# Patient Record
(demographics unavailable — no encounter records)

---

## 2025-04-28 NOTE — PHYSICAL EXAM
[Normal Rate and Rhythm] : normal rate and rhythm [2+] : left 2+ [Ankle Swelling (On Exam)] : present [Ankle Swelling Bilaterally] : bilaterally  [Ankle Swelling On The Right] : mild [Varicose Veins Of Lower Extremities] : not present [] : not present [Abdomen Tenderness] : ~T ~M No abdominal tenderness [Alert] : alert [Oriented to Person] : oriented to person [Oriented to Place] : oriented to place [Oriented to Time] : oriented to time [Calm] : calm [de-identified] : NAD [de-identified] : supple, no masses [de-identified] : unlabored breathing [de-identified] : FROM of all 4 extremities

## 2025-04-28 NOTE — PHYSICAL EXAM
[Normal Conjunctiva] : normal conjunctiva [Normal Venous Pressure] : normal venous pressure [No Carotid Bruit] : no carotid bruit [Clear Lung Fields] : clear lung fields [Good Air Entry] : good air entry [No Respiratory Distress] : no respiratory distress  [Non Tender] : non-tender [Normal Radial B/L] : normal radial B/L [Normal] : no rash, no skin lesions [Well Developed] : well developed [Well Nourished] : well nourished [Elevated JVD ____cm] : elevated JVD ~Vcm [Normal S1, S2] : normal S1, S2 [S4] : S4 [Murmur] : murmur [Mc ___] : mc CarterV [Soft] : abdomen soft [Hepatomegaly] : hepatomegaly [Abnormal Gait] : abnormal gait [Edema ___] : edema [unfilled] [No Rash] : no rash [Memory Deficit] : memory deficit [Cognitive Impairment] : cognitive impairment [Appears Anxious] : appears anxious [de-identified] : ESM at B

## 2025-04-28 NOTE — REASON FOR VISIT
[Symptom and Test Evaluation] : symptom and test evaluation [Hyperlipidemia] : hyperlipidemia [Hypertension] : hypertension [Other: ____] : [unfilled] [Family Member] : family member

## 2025-04-28 NOTE — ASSESSMENT
[FreeTextEntry1] : Chest pain atypical will get echocardiogram and pharmacologic nuclear stress to rule out ischemia.  Hypertension controlled multiple drugs.Continue  Hyperlipidemia will continue current medications get lipid levels.  Diabetes mellitus on multiple medications including insulin we will monitor.  CKD disease and pending hemodialysis with blood levels including CBC to check for degree of anemia.

## 2025-04-28 NOTE — CARDIOLOGY SUMMARY
[de-identified] : Electrocardiogram done today 4/28/2020 is reviewed by me revealing normal sinus rhythm at 66 bpm. P wave   Abnormality normal R progression.

## 2025-04-28 NOTE — HISTORY OF PRESENT ILLNESS
[FreeTextEntry1] : 76-year-old male with a history of longstanding diabetes hypertension hyperlipidemia who in some planes doing great check to half years ago and since then he has been gradually progressively declining to the point that about 4 months ago was told that he had recent end-stage renal disease and required with possible dialysis.  Patient elected to go to [facility] for 3 months and now returns for.  He has been referred to me for cardiovascular evaluation prior to planned AV fistula insertion  for hemodialysis.  Patient claims he has poor appetite and has lost weight is mentally not as alert as he used to be before there is no syncope he does have occasional palpitations he has shortness of breath on exertion walking about a block also which he claims is better since he has been taking some of the inhalers he does have some swelling of the feet which is noticed recently.

## 2025-04-28 NOTE — HISTORY OF PRESENT ILLNESS
[FreeTextEntry1] : 76-year-old former smoking male with past medical history of hypertension, hyperlipidemia, diabetes mellitus type 2, asthma, CKD stage IV presenting for evaluation for creation of HD access. Pt is right hand dominant. Denies complaints of chest pain, SOB, MCCARTHY, claudication, leg wounds or leg edema at this time. Denies history of central lines, prior catheters, or pacemaker. Patient will require initiation of HD in the next few months, per nephrologist, Dr. Lima.

## 2025-04-28 NOTE — ASSESSMENT
[FreeTextEntry1] : 76-year-old former smoking male with past medical history of hypertension, hyperlipidemia, diabetes mellitus type 2, asthma, CKD stage IV presenting for evaluation for creation of HD access.  Vein mapping of left arm performed in the office today demonstrates suitable cephalic and basilic veins which are patent and at least 2mm in diameter. No DVT or SVT.  Plan Patient will be scheduled for creation of LUE AVF, possible graft Medicine and cardiology clearance will be obtained preoperatively He will have to hold Januvia prior to undergoing the procedure Patient reminded to restrict IVs, blood draws and BP measurements from the arm Risks and complications of the procedure explained in office today, including bleeding, infection, thrombosis, hand/ digit ischemia or neuropathy requiring immediate ligation of access and she/he is agreeable to proceed

## 2025-04-28 NOTE — REVIEW OF SYSTEMS
[Weight Loss (___ Lbs)] : [unfilled] ~Ulb weight loss [Sore Throat] : sore throat [Sinus Pressure] : sinus pressure [SOB] : shortness of breath [Dyspnea on exertion] : dyspnea during exertion [Chest Discomfort] : chest discomfort [Joint Pain] : joint pain [Confusion] : confusion was observed [Anxiety] : anxiety [Negative] : Heme/Lymph [Lower Ext Edema] : no extremity edema [Leg Claudication] : no intermittent leg claudication [Palpitations] : no palpitations [Orthopnea] : no orthopnea [PND] : no PND [Syncope] : no syncope [Cough] : no cough [Wheezing] : no wheezing [Blood in stool] : no blood in stoo [Urinary Frequency] : no change in urinary frequency [Dizziness] : no dizziness [FreeTextEntry8] : H/O Ca Prostate

## 2025-07-07 NOTE — PROCEDURE
[FreeTextEntry1] : PET/CT 5/7/2024: No evidence of malignant nodules in the lungs.  Small areas of subsegmental atelectasis in both lungs.  No evident pleural effusion.  No evident adenopathy.  See report.  CT chest 5/21/2025: Mild focal bronchiectasis and bronchial wall thickening.  Mild emphysematous changes.  Multiple nodules measuring up to 4 mm in size.  No lymphadenopathy.  PFT 7/7/2025: Moderate obstruction.  Mild restriction.  Moderate reduction in diffusion.  No significant change after bronchodilator.  The findings are consistent with emphysema.

## 2025-07-07 NOTE — DISCUSSION/SUMMARY
[FreeTextEntry1] : Impression Moderate COPD Pulmonary nodules Former smoker - Stopped in the remote past CKD  Recommend Advised to use Breztri 2 puffs twice daily as indicated Continue with albuterol as needed Follow-up CT of the chest - At 3-month interval as per radiology I will see him again in 2 months or sooner if needed   Time spent preparing for the visit, interviewing and examining the patient, reviewing pertinent and data imaging, discussing the issues and recommendations was 45 minutes excluding other billable services.   PFT performed to assist in clinical decision making.

## 2025-07-07 NOTE — HISTORY OF PRESENT ILLNESS
[Former] : former [Never] : never [Difficulty Maintaining Sleep] : difficulty maintaining sleep [TextBox_4] : He is a 76-year-old man with a history of hypertension, hyperlipidemia, diabetes, chronic kidney disease, anemia and prostate cancer.  He presented with a chronic cough.  It is productive of times, mostly producing yellow phlegm.  No hemoptysis.  He has been told that he may have COPD and was placed on albuterol which he uses as needed.  He was also given Breztri but does not use it regularly.  He has a history of ESRD and recently had a fistula placed in his left arm.  Hemodialysis has not been started but it is anticipated that it would be needed in the near future. [YearQuit] : 1995

## 2025-07-07 NOTE — REVIEW OF SYSTEMS
[Nasal Congestion] : nasal congestion [Cough] : cough [Sputum] : sputum [Diabetes] : diabetes [Fever] : no fever [Fatigue] : no fatigue [Hemoptysis] : no hemoptysis [Chest Tightness] : no chest tightness [Wheezing] : no wheezing [Chest Discomfort] : no chest discomfort [Hay Fever] : no hay fever [GERD] : no gerd [Back Pain] : no back pain [Rash] : no rash [Anxiety] : no anxiety [Obesity] : no obesity

## 2025-07-28 NOTE — REVIEW OF SYSTEMS
[Feeling Fatigued] : feeling fatigued [Blurry Vision] : blurred vision [Dyspnea on exertion] : dyspnea during exertion [Chest Discomfort] : chest discomfort [Lower Ext Edema] : lower extremity edema [Joint Pain] : joint pain [Negative] : Heme/Lymph [SOB] : no shortness of breath [Leg Claudication] : no intermittent leg claudication [Palpitations] : no palpitations [Orthopnea] : no orthopnea [PND] : no PND [Syncope] : no syncope [Urinary Frequency] : no change in urinary frequency

## 2025-07-28 NOTE — REASON FOR VISIT
[Structural Heart and Valve Disease] : structural heart and valve disease [Hyperlipidemia] : hyperlipidemia [Hypertension] : hypertension [Coronary Artery Disease] : coronary artery disease [Carotid, Aortic and Peripheral Vascular Disease] : carotid, aortic and peripheral vascular disease [Other: ____] : [unfilled] [Family Member] : family member

## 2025-07-28 NOTE — CARDIOLOGY SUMMARY
[de-identified] : EKG done today on 7/28/2025 reviewed by me reveals sinus bradycardia 54 bpm otherwise unremarkable. [de-identified] : 5/6/2025 Conclusions: 1. Normal myocardial perfusion scan, with no evidence of infarction or inducible ischemia. 2. Normal left ventricular regional wall motion. 3. Normal left ventricular diastolic function. The post stress left ventricular EF is 69 %. The stress end  diastolic volume is 89 ml and systolic volume is 27 ml. 4. Normal right ventricular function. There is no right ventricular dilation [de-identified] : 5/5/2025 CONCLUSIONS: 1. Left ventricular systolic function is normal with an ejection fraction of 60 % by Vidal's method of  disks. 2. Left ventricular global longitudinal strain is -19.2 % which is normal (< -18%). Images were acquired on  a [employer] ultrasound system and processed on the ultrasound machine with a heart rate of 88 bpm and a  blood pressure of 142/63 mmHg. 3. Normal right ventricular cavity size. 4. Left atrium is mildly dilated. 5. Mild aortic stenosis. 6. Mild aortic regurgitation. 7. Mild mitral regurgitation. 8. Estimated pulmonary artery systolic pressure is 65 mmHg, consistent with moderate pulmonary  hypertension. 9. Trace pericardial effusion noted adjacent to the right atrium. 10. No prior echocardiogram is available for comparison.

## 2025-07-28 NOTE — HISTORY OF PRESENT ILLNESS
[FreeTextEntry1] : 76-year-old male with history of diabetes hypertension hyperlipidemia and end-stage renal disease with progressive decline in kidney function who  was cleared for AV fistula placement.  Apparently the fistula did not function and he needs a redo and is here for clearance again.  Patient continues to have poor appetite has occasional fatigue is able to walk around without problems he had a complete workup done in May which results of which are enclosed below. As his blood pressure has been gradually declining quite a few of his antihypertensive medications have been discontinued.

## 2025-07-28 NOTE — ASSESSMENT
[FreeTextEntry1] : Chronic CKD disease pending hemodialysis awaiting review of AV fistula.  COPD he has occasional cough followed by pulmonary.  Hyperlipidemia on medications, advised to continue.  Primary hypertension controlled with less and less medications.  Probably because of uremia.  Aortic stenosis which is mild will need to be followed at a periodic basis will monitor.  Risk factor modification by keeping his cholesterol and blood pressure under control.  Cardiac status is stable for the planned redo AV fistula surgery.

## 2025-07-28 NOTE — PHYSICAL EXAM
[Well Developed] : well developed [Normal Conjunctiva] : normal conjunctiva [Normal Venous Pressure] : normal venous pressure [Normal S1, S2] : normal S1, S2 [Fidencio ____] : fidencio [unfilled] [Wheeze ____] : wheeze [unfilled] [Soft] : abdomen soft [Normal Gait] : normal gait [Edema ___] : edema [unfilled] [Normal] : alert and oriented, normal memory